# Patient Record
Sex: MALE | Race: BLACK OR AFRICAN AMERICAN
[De-identification: names, ages, dates, MRNs, and addresses within clinical notes are randomized per-mention and may not be internally consistent; named-entity substitution may affect disease eponyms.]

---

## 2021-04-03 ENCOUNTER — HOSPITAL ENCOUNTER (EMERGENCY)
Dept: HOSPITAL 63 - ER | Age: 21
Discharge: HOME | End: 2021-04-03
Payer: COMMERCIAL

## 2021-04-03 VITALS — DIASTOLIC BLOOD PRESSURE: 63 MMHG | SYSTOLIC BLOOD PRESSURE: 116 MMHG

## 2021-04-03 VITALS — BODY MASS INDEX: 23.23 KG/M2 | WEIGHT: 175.27 LBS | HEIGHT: 73 IN

## 2021-04-03 DIAGNOSIS — R51.9: Primary | ICD-10-CM

## 2021-04-03 DIAGNOSIS — G89.29: ICD-10-CM

## 2021-04-03 PROCEDURE — 70450 CT HEAD/BRAIN W/O DYE: CPT

## 2021-04-03 NOTE — RAD
CT scan of the head without contrast 4/3/2021



Clinical History: Chronic headache.



Technique: Unenhanced, contiguous, 5 mm axial sections were obtained through the head.



One or more of the following individualized dose reduction techniques were utilized for this study:



1. Automated exposure control.

2. Adjustment of the mA and/or kV according to patient size.

3. Use of iterative reconstruction technique.





Findings: No previous studies are available for comparison. 



The patient is post right medial frontal craniotomy. The ventricles and sulci are within normal limit
s in size and configuration. No acute parenchymal abnormality is seen. No extra-axial fluid collectio
n is noted. No skull fracture is seen.



Impression:  No acute intracranial abnormality is seen.



Electronically signed by: Clint Otoole MD (4/3/2021 8:55 AM) [id number]
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
## 2021-04-12 ENCOUNTER — HOSPITAL ENCOUNTER (OUTPATIENT)
Dept: HOSPITAL 61 - KCIC MRI | Age: 21
End: 2021-04-12
Attending: PHYSICIAN ASSISTANT
Payer: COMMERCIAL

## 2021-04-12 DIAGNOSIS — G93.0: Primary | ICD-10-CM

## 2021-04-12 DIAGNOSIS — L72.0: ICD-10-CM

## 2021-04-12 PROCEDURE — 70553 MRI BRAIN STEM W/O & W/DYE: CPT

## 2021-04-12 RX ADMIN — GADOTERATE MEGLUMINE ONE ML: 376.9 INJECTION, SOLUTION INTRAVENOUS at 14:36

## 2021-04-13 NOTE — [facility]
MRI BRAIN WO+W 



Date: 4/12/2021 1:55 PM 



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 Some Ha's 



Comparison:  CT head 4/3/2021. 



Technique: Multiplanar multisequence MRI of the brain was performed with and without intravenous cont
rast using the standard protocol. 15 cc Clariscan contrast was administered intravenously during the 
exam. 



Findings: 



Postsurgical changes of right medial frontal craniotomy. Operative tract extends inferiorly along the
 interhemispheric fissure to the third ventricle. At the inferior aspect of the operative pathway and
 at the superior has been of the third ventricle is a nonenhancing cystic space without DWI hyperinte
nsity measuring 1.8 x 1.4 x 1.6 cm (AP by TV by CC). Mass effect on the lateral ventricles just proxi
mal to the foramen of Ling. Lateral ventricles are not dilated.



No acute infarct. No acute emorrhage. No abnormal enhancement.



The scalp and calvarium are normal. The pituitary and sella are normal. No Chiari malformation. The v
isualized upper cervical spine is normal.



The visualized orbits and globes are normal. The visualized paranasal sinuses are clear. The mastoid 
air cells are clear.



Normal flow voids within the vertebral, basilar, and internal carotid arteries indicating patency.



IMPRESSION:



Postsurgical changes of right medial frontal craniotomy for lesion resection. Cystic space measuring 
1.8 cm at the superior aspect of the third ventricle has no DWI hyperintensity to suggest residual ep
idermoid cyst, and may represent a resection cavity. This cystic space exerts mass effect on the late
ral ventricles by the foramen of Ling, with no lateral ventricle dilatation.



Electronically signed by: Francisco Conteh MD (4/13/2021 10:53 AM) [id number]